# Patient Record
Sex: MALE | URBAN - METROPOLITAN AREA
[De-identification: names, ages, dates, MRNs, and addresses within clinical notes are randomized per-mention and may not be internally consistent; named-entity substitution may affect disease eponyms.]

---

## 2023-11-29 ENCOUNTER — OFFICE VISIT (OUTPATIENT)
Dept: ENT CLINIC | Age: 83
End: 2023-11-29

## 2023-11-29 VITALS
OXYGEN SATURATION: 93 % | WEIGHT: 195 LBS | HEIGHT: 71 IN | DIASTOLIC BLOOD PRESSURE: 77 MMHG | TEMPERATURE: 97.5 F | SYSTOLIC BLOOD PRESSURE: 139 MMHG | HEART RATE: 61 BPM | RESPIRATION RATE: 12 BRPM | BODY MASS INDEX: 27.3 KG/M2

## 2023-11-29 DIAGNOSIS — H91.8X3 ASYMMETRICAL HEARING LOSS: ICD-10-CM

## 2023-11-29 DIAGNOSIS — H90.3 SENSORINEURAL HEARING LOSS (SNHL) OF BOTH EARS: Primary | ICD-10-CM

## 2023-11-29 RX ORDER — ALBUTEROL SULFATE 90 UG/1
AEROSOL, METERED RESPIRATORY (INHALATION)
COMMUNITY
Start: 2023-06-07

## 2023-11-29 RX ORDER — HYDROCHLOROTHIAZIDE 25 MG/1
25 TABLET ORAL
COMMUNITY
Start: 2023-10-04

## 2023-11-29 RX ORDER — FLUTICASONE PROPIONATE AND SALMETEROL 250; 50 UG/1; UG/1
POWDER RESPIRATORY (INHALATION)
COMMUNITY
Start: 2023-01-05

## 2023-11-29 RX ORDER — ATORVASTATIN CALCIUM 80 MG/1
80 TABLET, FILM COATED ORAL
COMMUNITY
Start: 2023-01-05

## 2023-11-29 RX ORDER — ATENOLOL 25 MG/1
25 TABLET ORAL
COMMUNITY
Start: 2023-08-31

## 2023-11-29 ASSESSMENT — ENCOUNTER SYMPTOMS
SINUS PRESSURE: 0
RHINORRHEA: 0
SORE THROAT: 0
ALLERGIC/IMMUNOLOGIC NEGATIVE: 1
COUGH: 0
EYE PAIN: 0
VOMITING: 0
EYE DISCHARGE: 0
DIARRHEA: 0
BACK PAIN: 0
SHORTNESS OF BREATH: 0

## 2023-11-29 NOTE — PROGRESS NOTES
Subjective:     Patient ID:  Merry Mendoza is a 81 y.o. male. HPI:    Hearing Loss  Patient presents today with complaints of hearing loss. Concern regarding hearing has been present for several years. He has failed a prior hearing test.The patient reports difficulty hearing, turning up the T.V., saying \"huh\" or \"what\". Patient is currently wearing a hearing aid in the bilateral ear. History of Head trauma: no   Description: none  History of surgery to the head/neck: no   Description:none  History of cerumen impaction: no  History of noise exposure: yes   Type: [employer] and worked in a [address] for almost 20 years. Spinning: no  Hearing loss: yes    Fluctuating: no  Aural pressure: no  Tinnitus:no  Otalgia:no    Patient states one month ago he was fitted for new HA bilaterally. States he is doing really well since he received the new VIVAR. Was referred by [place] for significant changes over the past several years. History reviewed. No pertinent past medical history. History reviewed. No pertinent surgical history. History reviewed. No pertinent family history. Social History     Socioeconomic History    Marital status: Unknown     Spouse name: None    Number of children: None    Years of education: None    Highest education level: None   Tobacco Use    Smoking status: Never    Smokeless tobacco: Never   Substance and Sexual Activity    Alcohol use: Never    Drug use: Never     No Known Allergies      Review of Systems   Constitutional:  Negative for chills and fever. HENT:  Positive for hearing loss. Negative for congestion, ear discharge, ear pain, postnasal drip, rhinorrhea, sinus pressure, sneezing, sore throat and tinnitus. Eyes:  Negative for pain and discharge. Respiratory:  Negative for cough and shortness of breath. Cardiovascular:  Negative for chest pain. Gastrointestinal:  Negative for diarrhea and vomiting. Genitourinary:  Negative for flank pain.    Musculoskeletal: